# Patient Record
Sex: MALE
[De-identification: names, ages, dates, MRNs, and addresses within clinical notes are randomized per-mention and may not be internally consistent; named-entity substitution may affect disease eponyms.]

---

## 2022-04-22 ENCOUNTER — APPOINTMENT (OUTPATIENT)
Dept: ORTHOPEDIC SURGERY | Facility: CLINIC | Age: 87
End: 2022-04-22

## 2022-07-04 ENCOUNTER — EMERGENCY (EMERGENCY)
Facility: HOSPITAL | Age: 87
LOS: 1 days | Discharge: HOME | End: 2022-07-04
Attending: EMERGENCY MEDICINE
Payer: MEDICARE

## 2022-07-04 VITALS
TEMPERATURE: 98 F | DIASTOLIC BLOOD PRESSURE: 108 MMHG | SYSTOLIC BLOOD PRESSURE: 196 MMHG | OXYGEN SATURATION: 98 % | WEIGHT: 145.06 LBS | RESPIRATION RATE: 19 BRPM | HEIGHT: 66 IN | HEART RATE: 78 BPM

## 2022-07-04 PROCEDURE — 99284 EMERGENCY DEPT VISIT MOD MDM: CPT

## 2022-07-04 NOTE — ED PROVIDER NOTE - NS ED ROS FT
CONST: no fevers, no chills  EYES: no pain. +blurry vision  ENT: no sore throat, no ear pain, no change in hearing  CV: no chest pain, no leg swelling  RESP: no shortness of breath, no cough  ABD: no abdominal pain, no nausea, no vomiting, no diarrhea  : no dysuria, no flank pain, no hematuria  MSK: no back pain, no extremity pain  NEURO: no headache or additional neurologic complaints  HEME: no easy bleeding  SKIN:  no rash

## 2022-07-04 NOTE — ED PROVIDER NOTE - PHYSICAL EXAMINATION
GENERAL: Awake, alert, NAD  HEENT: NC/AT, moist mucous membranes, PERRL, EOMI. R eye pressure 15, L eye pressure 17. Visual acuity R eye 20/70, L 20/50  LUNGS: CTAB, no wheezes or crackles   CARDIAC: RRR, no m/r/g  ABDOMEN: Soft,  non tender, non distended, no rebound, no guarding  EXT: No edema, no calf tenderness  NEURO: A&Ox3. Moving all extremities.  SKIN: Warm and dry. No rash.

## 2022-07-04 NOTE — ED ADULT TRIAGE NOTE - CHIEF COMPLAINT QUOTE
R eye blurry vision since yesterday 0700 AM. Hx cataract surgery 1 week ago on R eye. BEFAST negative.

## 2022-07-04 NOTE — ED PROVIDER NOTE - CLINICAL SUMMARY MEDICAL DECISION MAKING FREE TEXT BOX
sal -87 m sp cataract surg last week with rt eye blurriness - pt reports vison was good - then woke up on sunday with blurriness - on exam 20/30 affected eye -- reactive - field intract no dc no erythema check pressures as pt is on pred forte - r/o mild glaucoma- ninaley wo pain optho to evaluate

## 2022-07-04 NOTE — ED PROVIDER NOTE - NSFOLLOWUPINSTRUCTIONS_ED_ALL_ED_FT
1. Please follow up with your Ophthalmologist for follow up after surgery.     2. You were seen by our service and found with no emergent findings at this time.     3. Continue your medications as prescribed.     4. Please return to ED if you have persistent or worsening symptoms or new onset eye pain and redness.

## 2022-07-04 NOTE — ED PROVIDER NOTE - PATIENT PORTAL LINK FT
You can access the FollowMyHealth Patient Portal offered by [facility] by registering at the following website: [email or web address]. By joining [facility]’s FollowMyHealth portal, you will also be able to view your health information using other applications (apps) compatible with our system.

## 2022-07-04 NOTE — ED PROVIDER NOTE - OBJECTIVE STATEMENT
87y male pt PMHx glaucoma w recent glaucoma sx 1 week ago over R eye coming in to ED endorsing blurry vision over said eye. Denies eye pain, loss of complete vision, or conjunctival erythema. On prednisone, ketorolac, and ofloxacin ophthalmic drops

## 2022-07-04 NOTE — CONSULT NOTE ADULT - ASSESSMENT
Assessment and Recommendations:  87y male with a past medical history/ocular history of cataract surgery x1 week, found to have healed corneal abrasion OD.       #Healed Corneal Abrasion OD  -Pt with blurry vision on Saturday, with improvement by time of interview. On our exam, pt was 20/50 PH 20/30- OD, and 20/25 OS. PERRL no APD. IOP 16 OU. CVF full, EOM full, color plates full. Anterior exam showed linear spk paracentrally OD consistent with healed corneal abrasion. Surgical wound was alicia negative. No corneal haze suggestive of edema OD. OS was within normal limits. Dilated fundus exam within normal limits OU.   -    Outpatient Follow-up: Patient should follow-up with his/her ophthalmologist or with [facility] Department of Ophthalmology within 1 week of after discharge at:    [address]  [place] [address]  [phone]    cT Chaudhry MD PGY 1  Available on [employer] Teams Assessment and Recommendations:  87y male with a past medical history/ocular history of cataract surgery x1 week, found to have healed corneal abrasion OD.       #Healed Corneal Abrasion OD  -Pt with blurry vision on Saturday, with improvement by time of interview. On our exam, pt was 20/50 PH 20/30- OD, and 20/25 OS. PERRL no APD. IOP 16 OU. CVF full, EOM full, color plates full. Anterior exam showed linear spk paracentrally OD consistent with healed corneal abrasion. Surgical wound was alicia negative. No corneal haze suggestive of edema OD. OS was within normal limits. Dilated fundus exam within normal limits OU.   -Exam consistent with corneal abrasion. Pt already on Ofloxacin, Ketorolac, and Pred Forte post-operatively. Recommend patient continue with this medication course until his follow-up appointment on Thursday at [facility].     DW Dr. Hood Sales MD PGY-4    Outpatient Follow-up: Patient should follow-up with his/her ophthalmologist or with [facility] Department of Ophthalmology within 1 week of after discharge at:    [address]  [place] [address]  [phone]    Tc Chaudhry MD PGY 1  Available on Microsoft Teams

## 2022-07-04 NOTE — CONSULT NOTE ADULT - SUBJECTIVE AND OBJECTIVE BOX
[facility] DEPARTMENT OF OPHTHALMOLOGY - INITIAL ADULT CONSULT  -----------------------------------------------------------------------------------------------------------------  Tc Chaudhry MD PGY 1  Contact via [employer] Teams  -----------------------------------------------------------------------------------------------------------------    HPI: 87M hx of cataract surgery OD 1 week ago presenting with sudden onset blurry vision 2 days duration. At the time of this exam patient states his blurry vision has essentially resolved. Patient denies ever having eye redness, tearing, photosensivity or pain. States that "numbing" drop given by ED provider made him feel better. States he was rubbing his eye on Friday.     PAST MEDICAL & SURGICAL HISTORY:    Past Ocular History: CEIOL OD.     Family History:  FAMILY HISTORY:          Ophthalmic Medications: Ofloxacin, Ketorolac, Pred Forte OD.    MEDICATIONS  (STANDING):    MEDICATIONS  (PRN):    Allergies & Intolerances:     Review of Systems:  Constitutional: No fever, chills  Eyes: No blurry vision, flashes, floaters, FBS, erythema, discharge, double vision, OU  Neuro: No tremors  Cardiovascular: No chest pain, palpitations  Respiratory: No SOB, no cough  GI: No nausea, vomiting, abdominal pain  : No dysuria  Skin: no rash  Psych: no depression  Endocrine: no polyuria, polydipsia  Heme/lymph: no swelling    VITALS: T(C): 36.7 (07-04-22 @ 09:21)  T(F): 98.1 (07-04-22 @ 09:21), Max: 98.1 (07-04-22 @ 09:21)  HR: 78 (07-04-22 @ 09:21) (78 - 78)  BP: 196/108 (07-04-22 @ 09:21) (196/108 - 196/108)  RR:  (19 - 19)  SpO2:  (98% - 98%)  Wt(kg): --  General: AAO x 3, appropriate mood and affect    Ophthalmology Exam:  Visual acuity (sc): 20/50 PH 20/30- OD, 20/25 OS.  Pupils: PERRL OU, no APD  Tonometry:  16 OU  Extraocular movements (EOMs): Full OU, no pain, no diplopia.  Confrontational Visual Field (CVF): Full OU.  Color Plates: 12/12 OU.    Pen Light Exam (PLE)  External: Normal OU.  Lids/Lashes/Lacrimal Ducts: Flat OU.  Sclera/Conjunctiva: White and quiet OU.  Cornea: Paracentral linear SPK, likely representing healed corneal abrasion OD. No bethel epi defect noted OD. Beverly negative. Clear OS.   Anterior Chamber: Deep and formed OU.    Iris: Flat OU.  Lens: PCIOL OD. NS OS.     Fundus Exam: dilated with 1% tropicamide and 2.5% phenylephrine  Approval obtained from primary team for dilation  Patient aware that pupils can remained dilated for at least 4-6 hours.  Exam performed with 20 D lens    Vitreous: wnl OU  Disc, cup/disc: sharp and pink, 0.4 OU  Macula: wnl OU  Vessels: wnl OU  Periphery: wnl OU

## 2022-07-04 NOTE — ED ADULT NURSE NOTE - OBJECTIVE STATEMENT
87 male sp cataract surg last week with rt eye blurriness - pt reports vison was good - then woke up on sunday with blurriness - on exam 20/30 affected eye -- reactive - field intract no dc no erythema check pressures as pt is on pred forte - r/o mild glaucoma- unlikley wo pain optho to evaluate  88 yo male complaining of blurriness on R eye after surgery a week ago for catacact. Pt alerted and oriented x3, family at the bedside, no sings of acute distress noted, pending on MD examination.

## 2022-11-22 PROBLEM — Z00.00 ENCOUNTER FOR PREVENTIVE HEALTH EXAMINATION: Status: ACTIVE | Noted: 2022-11-22

## 2024-09-23 PROBLEM — H40.9 UNSPECIFIED GLAUCOMA: Chronic | Status: ACTIVE | Noted: 2022-07-04

## 2024-09-27 ENCOUNTER — APPOINTMENT (OUTPATIENT)
Dept: ORTHOPEDIC SURGERY | Facility: CLINIC | Age: 89
End: 2024-09-27
Payer: MEDICARE

## 2024-09-27 VITALS — HEIGHT: 66 IN | WEIGHT: 154 LBS | BODY MASS INDEX: 24.75 KG/M2

## 2024-09-27 DIAGNOSIS — M19.071 PRIMARY OSTEOARTHRITIS, RIGHT ANKLE AND FOOT: ICD-10-CM

## 2024-09-27 PROCEDURE — 73610 X-RAY EXAM OF ANKLE: CPT | Mod: RT

## 2024-09-27 PROCEDURE — 99204 OFFICE O/P NEW MOD 45 MIN: CPT

## 2024-09-27 PROCEDURE — 99203 OFFICE O/P NEW LOW 30 MIN: CPT

## 2024-09-27 PROCEDURE — L1902: CPT | Mod: RT

## 2024-09-27 RX ORDER — MELOXICAM 15 MG/1
TABLET ORAL
Refills: 0 | Status: ACTIVE | COMMUNITY

## 2024-09-27 NOTE — PHYSICAL EXAM
[Mild] : mild diffused ankle swelling [NL (40)] : plantar flexion 40 degrees [NL 30)] : inversion 30 degrees [NL (20)] : eversion 20 degrees [5___] : inversion 5[unfilled]/5 [4___] : eversion 4[unfilled]/5 [2+] : posterior tibialis pulse: 2+ [Normal] : saphenous nerve sensation normal [] : ambulation with cane [Right] : right ankle [Weight -] : weightbearing [FreeTextEntry9] : Tibiotalar joint degenerative changes which has worsened compared to 2022 films. There is 14 degrees of tibiotalar valgus tilt.  [TWNoteComboBox7] : dorsiflexion 10 degrees

## 2024-09-27 NOTE — HISTORY OF PRESENT ILLNESS
[9] : 9 [Sharp] : sharp [Nothing helps with pain getting better] : Nothing helps with pain getting better [Walking] : walking [de-identified] : DONAVAN NOLAND a 89 year  old male here for evaluation of his right ankle . Patient states he fell down 2 years ago. Has a known history of ankle arthritis. He presents today WB in sneakers, using a cane for support. Takes Mobic 15 mg qday without significant relief. No new trauma or injury but has been having some increased pain recently.  [] : no [de-identified] : [facility]

## 2024-09-27 NOTE — DISCUSSION/SUMMARY
[de-identified] : Discussed treatment options with patient. WBAT in supportive footwear, lace up brace.  NSAIDS (topical vs. oral), ice, activity modification discussed.  The role of a steroid injection discussed with patient, he declines at this time.

## 2024-11-08 ENCOUNTER — APPOINTMENT (OUTPATIENT)
Dept: ORTHOPEDIC SURGERY | Facility: CLINIC | Age: 89
End: 2024-11-08
Payer: MEDICARE

## 2024-11-08 DIAGNOSIS — M19.071 PRIMARY OSTEOARTHRITIS, RIGHT ANKLE AND FOOT: ICD-10-CM

## 2024-11-08 PROCEDURE — 99213 OFFICE O/P EST LOW 20 MIN: CPT

## 2024-12-04 ENCOUNTER — APPOINTMENT (OUTPATIENT)
Dept: ORTHOPEDIC SURGERY | Facility: CLINIC | Age: 89
End: 2024-12-04
Payer: MEDICARE

## 2024-12-04 DIAGNOSIS — M19.071 PRIMARY OSTEOARTHRITIS, RIGHT ANKLE AND FOOT: ICD-10-CM

## 2024-12-04 DIAGNOSIS — Z78.9 OTHER SPECIFIED HEALTH STATUS: ICD-10-CM

## 2024-12-04 PROCEDURE — 20606 DRAIN/INJ JOINT/BURSA W/US: CPT | Mod: RT

## 2024-12-04 PROCEDURE — J3490M: CUSTOM | Mod: JZ

## 2024-12-04 PROCEDURE — 99214 OFFICE O/P EST MOD 30 MIN: CPT | Mod: 25

## 2025-01-13 ENCOUNTER — APPOINTMENT (OUTPATIENT)
Dept: ORTHOPEDIC SURGERY | Facility: CLINIC | Age: 89
End: 2025-01-13

## 2025-04-23 ENCOUNTER — APPOINTMENT (OUTPATIENT)
Dept: ORTHOPEDIC SURGERY | Facility: CLINIC | Age: 89
End: 2025-04-23

## 2025-04-23 VITALS — HEIGHT: 66 IN | BODY MASS INDEX: 24.11 KG/M2 | WEIGHT: 150 LBS

## 2025-04-23 DIAGNOSIS — M16.11 UNILATERAL PRIMARY OSTEOARTHRITIS, RIGHT HIP: ICD-10-CM

## 2025-04-23 PROCEDURE — 99213 OFFICE O/P EST LOW 20 MIN: CPT | Mod: 25

## 2025-04-23 PROCEDURE — 73503 X-RAY EXAM HIP UNI 4/> VIEWS: CPT | Mod: RT

## 2025-05-15 ENCOUNTER — APPOINTMENT (OUTPATIENT)
Dept: PAIN MANAGEMENT | Facility: CLINIC | Age: 89
End: 2025-05-15

## 2025-05-15 DIAGNOSIS — M16.11 UNILATERAL PRIMARY OSTEOARTHRITIS, RIGHT HIP: ICD-10-CM

## 2025-05-15 PROCEDURE — J3490M: CUSTOM

## 2025-05-15 PROCEDURE — 27093 INJECTION FOR HIP X-RAY: CPT | Mod: RT

## 2025-05-15 PROCEDURE — 73525 CONTRAST X-RAY OF HIP: CPT | Mod: 26,59

## 2025-06-13 ENCOUNTER — APPOINTMENT (OUTPATIENT)
Dept: ORTHOPEDIC SURGERY | Facility: CLINIC | Age: 89
End: 2025-06-13
Payer: MEDICARE

## 2025-06-13 PROCEDURE — 99215 OFFICE O/P EST HI 40 MIN: CPT

## 2025-06-18 ENCOUNTER — NON-APPOINTMENT (OUTPATIENT)
Age: 89
End: 2025-06-18

## 2025-06-20 ENCOUNTER — APPOINTMENT (OUTPATIENT)
Dept: ORTHOPEDIC SURGERY | Facility: CLINIC | Age: 89
End: 2025-06-20

## 2025-06-20 ENCOUNTER — OUTPATIENT (OUTPATIENT)
Dept: OUTPATIENT SERVICES | Facility: HOSPITAL | Age: 89
LOS: 1 days | End: 2025-06-20
Payer: MEDICARE

## 2025-06-20 DIAGNOSIS — Z01.818 ENCOUNTER FOR OTHER PREPROCEDURAL EXAMINATION: ICD-10-CM

## 2025-06-20 DIAGNOSIS — Z98.890 OTHER SPECIFIED POSTPROCEDURAL STATES: Chronic | ICD-10-CM

## 2025-06-20 DIAGNOSIS — M16.11 UNILATERAL PRIMARY OSTEOARTHRITIS, RIGHT HIP: ICD-10-CM

## 2025-06-20 DIAGNOSIS — Z98.49 CATARACT EXTRACTION STATUS, UNSPECIFIED EYE: Chronic | ICD-10-CM

## 2025-06-20 LAB
A1C WITH ESTIMATED AVERAGE GLUCOSE RESULT: 4.9 % — SIGNIFICANT CHANGE UP (ref 4–5.6)
ALBUMIN SERPL ELPH-MCNC: 3.6 G/DL — SIGNIFICANT CHANGE UP (ref 3.3–5)
ALP SERPL-CCNC: 107 U/L — SIGNIFICANT CHANGE UP (ref 40–120)
ALT FLD-CCNC: 22 U/L — SIGNIFICANT CHANGE UP (ref 12–78)
ANION GAP SERPL CALC-SCNC: 5 MMOL/L — SIGNIFICANT CHANGE UP (ref 5–17)
APTT BLD: 32 SEC — SIGNIFICANT CHANGE UP (ref 26.1–36.8)
AST SERPL-CCNC: 25 U/L — SIGNIFICANT CHANGE UP (ref 15–37)
BASOPHILS # BLD AUTO: 0.04 K/UL — SIGNIFICANT CHANGE UP (ref 0–0.2)
BASOPHILS NFR BLD AUTO: 0.6 % — SIGNIFICANT CHANGE UP (ref 0–2)
BILIRUB SERPL-MCNC: 0.6 MG/DL — SIGNIFICANT CHANGE UP (ref 0.2–1.2)
BUN SERPL-MCNC: 15 MG/DL — SIGNIFICANT CHANGE UP (ref 7–23)
CALCIUM SERPL-MCNC: 9.8 MG/DL — SIGNIFICANT CHANGE UP (ref 8.5–10.1)
CHLORIDE SERPL-SCNC: 109 MMOL/L — HIGH (ref 96–108)
CO2 SERPL-SCNC: 27 MMOL/L — SIGNIFICANT CHANGE UP (ref 22–31)
CREAT SERPL-MCNC: 0.85 MG/DL — SIGNIFICANT CHANGE UP (ref 0.5–1.3)
EGFR: 84 ML/MIN/1.73M2 — SIGNIFICANT CHANGE UP
EGFR: 84 ML/MIN/1.73M2 — SIGNIFICANT CHANGE UP
EOSINOPHIL # BLD AUTO: 0.16 K/UL — SIGNIFICANT CHANGE UP (ref 0–0.5)
EOSINOPHIL NFR BLD AUTO: 2.5 % — SIGNIFICANT CHANGE UP (ref 0–6)
ESTIMATED AVERAGE GLUCOSE: 94 MG/DL — SIGNIFICANT CHANGE UP (ref 68–114)
GLUCOSE SERPL-MCNC: 101 MG/DL — HIGH (ref 70–99)
HCT VFR BLD CALC: 38.9 % — LOW (ref 39–50)
HGB BLD-MCNC: 13.3 G/DL — SIGNIFICANT CHANGE UP (ref 13–17)
IMM GRANULOCYTES NFR BLD AUTO: 0.5 % — SIGNIFICANT CHANGE UP (ref 0–0.9)
INR BLD: 0.96 RATIO — SIGNIFICANT CHANGE UP (ref 0.85–1.16)
LYMPHOCYTES # BLD AUTO: 1.15 K/UL — SIGNIFICANT CHANGE UP (ref 1–3.3)
LYMPHOCYTES # BLD AUTO: 18.1 % — SIGNIFICANT CHANGE UP (ref 13–44)
MCHC RBC-ENTMCNC: 31.1 PG — SIGNIFICANT CHANGE UP (ref 27–34)
MCHC RBC-ENTMCNC: 34.2 G/DL — SIGNIFICANT CHANGE UP (ref 32–36)
MCV RBC AUTO: 91.1 FL — SIGNIFICANT CHANGE UP (ref 80–100)
MONOCYTES # BLD AUTO: 0.41 K/UL — SIGNIFICANT CHANGE UP (ref 0–0.9)
MONOCYTES NFR BLD AUTO: 6.4 % — SIGNIFICANT CHANGE UP (ref 2–14)
NEUTROPHILS # BLD AUTO: 4.57 K/UL — SIGNIFICANT CHANGE UP (ref 1.8–7.4)
NEUTROPHILS NFR BLD AUTO: 71.9 % — SIGNIFICANT CHANGE UP (ref 43–77)
NRBC BLD AUTO-RTO: 0 /100 WBCS — SIGNIFICANT CHANGE UP (ref 0–0)
PLATELET # BLD AUTO: 249 K/UL — SIGNIFICANT CHANGE UP (ref 150–400)
POTASSIUM SERPL-MCNC: 4 MMOL/L — SIGNIFICANT CHANGE UP (ref 3.5–5.3)
POTASSIUM SERPL-SCNC: 4 MMOL/L — SIGNIFICANT CHANGE UP (ref 3.5–5.3)
PROT SERPL-MCNC: 7 GM/DL — SIGNIFICANT CHANGE UP (ref 6–8.3)
PROTHROM AB SERPL-ACNC: 10.8 SEC — SIGNIFICANT CHANGE UP (ref 9.9–13.4)
RBC # BLD: 4.27 M/UL — SIGNIFICANT CHANGE UP (ref 4.2–5.8)
RBC # FLD: 13.6 % — SIGNIFICANT CHANGE UP (ref 10.3–14.5)
SODIUM SERPL-SCNC: 141 MMOL/L — SIGNIFICANT CHANGE UP (ref 135–145)
WBC # BLD: 6.36 K/UL — SIGNIFICANT CHANGE UP (ref 3.8–10.5)
WBC # FLD AUTO: 6.36 K/UL — SIGNIFICANT CHANGE UP (ref 3.8–10.5)

## 2025-06-20 PROCEDURE — 93010 ELECTROCARDIOGRAM REPORT: CPT

## 2025-06-20 NOTE — H&P PST ADULT - ASSESSMENT
right hip osteoarthritis   CAPRINI SCORE    AGE RELATED RISK FACTORS                                                             [ ] Age 41-60 years                                            (1 Point)  [ ] Age: 61-74 years                                           (2 Points)                 [x ] Age= 75 years                                                (3 Points)             DISEASE RELATED RISK FACTORS                                                       [ ] Edema in the lower extremities                 (1 Point)                     [ ] Varicose veins                                               (1 Point)                                 [x ] BMI > 25 Kg/m2                                            (1 Point)                                  [ ] Serious infection (ie PNA)                            (1 Point)                     [ ] Lung disease ( COPD, Emphysema)            (1 Point)                                                                          [ ] Acute myocardial infarction                         (1 Point)                  [ ] Congestive heart failure (in the previous month)  (1 Point)         [ ] Inflammatory bowel disease                            (1 Point)                  [ ] Central venous access, PICC or Port               (2 points)       (within the last month)                                                                [ ] Stroke (in the previous month)                        (5 Points)    [ ] Previous or present malignancy                       (2 points)                                                                                                                                                         HEMATOLOGY RELATED FACTORS                                                         [ ] Prior episodes of VTE                                     (3 Points)                     [ ] Positive family history for VTE                      (3 Points)                  [ ] Prothrombin 90177 A                                     (3 Points)                     [ ] Factor V Leiden                                                (3 Points)                        [ ] Lupus anticoagulants                                      (3 Points)                                                           [ ] Anticardiolipin antibodies                              (3 Points)                                                       [ ] High homocysteine in the blood                   (3 Points)                                             [ ] Other congenital or acquired thrombophilia      (3 Points)                                                [ ] Heparin induced thrombocytopenia                  (3 Points)                                        MOBILITY RELATED FACTORS  [ ] Bed rest                                                         (1 Point)  [ ] Plaster cast                                                    (2 points)  [ ] Bed bound for more than 72 hours           (2 Points)    GENDER SPECIFIC FACTORS  [ ] Pregnancy or had a baby within the last month   (1 Point)  [ ] Post-partum < 6 weeks                                   (1 Point)  [ ] Hormonal therapy  or oral contraception   (1 Point)  [ ] History of pregnancy complications              (1 point)  [ ] Unexplained or recurrent              (1 Point)    OTHER RISK FACTORS                                           (1 Point)  [ ] BMI >40, smoking, diabetes requiring insulin, chemotherapy  blood transfusions and length of surgery over 2 hours    SURGERY RELATED RISK FACTORS  [ ]  Section within the last month     (1 Point)  [ ] Minor surgery                                                  (1 Point)  [ ] Arthroscopic surgery                                       (2 Points)  [ ] Planned major surgery lasting more            (2 Points)      than 45 minutes     x[ ] Elective hip or knee joint replacement       (5 points)       surgery                                                TRAUMA RELATED RISK FACTORS  [ ] Fracture of the hip, pelvis, or leg                       (5 Points)  [ ] Spinal cord injury resulting in paralysis             (5 points)       (in the previous month)    [ ] Paralysis  (less than 1 month)                             (5 Points)  [ ] Multiple Trauma within 1 month                        (5 Points)    Total Score [   9     ]    Caprini Score 0-2: Low Risk, NO VTE prophylaxis required for most patients, encourage ambulation  Caprini Score 3-6: Moderate Risk , pharmacologic VTE prophylaxis is indicated for most patients (in the absence of contraindications)  Caprini Score Greater than or =7: High risk, pharmocologic VTE prophylaxis indicated for most patients (in the absence of contraindications)

## 2025-06-20 NOTE — H&P PST ADULT - HISTORY OF PRESENT ILLNESS
89 yo male c/oo right hip pain 2/2 osteoarthritis - scheduled for right  hip arthroplasty  goal: to walk without pain

## 2025-06-20 NOTE — H&P PST ADULT - NSANTHOSAYNRD_GEN_A_CORE
No. JOSE RAFAEL screening performed.  STOP BANG Legend: 0-2 = LOW Risk; 3-4 = INTERMEDIATE Risk; 5-8 = HIGH Risk

## 2025-06-21 LAB
MRSA PCR RESULT.: SIGNIFICANT CHANGE UP
S AUREUS DNA NOSE QL NAA+PROBE: SIGNIFICANT CHANGE UP
VIT D25+D1,25 OH+D1,25 PNL SERPL-MCNC: 68.2 PG/ML — SIGNIFICANT CHANGE UP (ref 19.9–79.3)

## 2025-06-23 ENCOUNTER — APPOINTMENT (OUTPATIENT)
Dept: ORTHOPEDIC SURGERY | Facility: CLINIC | Age: 89
End: 2025-06-23
Payer: MEDICARE

## 2025-06-23 ENCOUNTER — NON-APPOINTMENT (OUTPATIENT)
Age: 89
End: 2025-06-23

## 2025-06-23 PROCEDURE — 99213 OFFICE O/P EST LOW 20 MIN: CPT

## 2025-06-24 ENCOUNTER — NON-APPOINTMENT (OUTPATIENT)
Age: 89
End: 2025-06-24

## 2025-06-24 ENCOUNTER — APPOINTMENT (OUTPATIENT)
Dept: CARDIOLOGY | Facility: CLINIC | Age: 89
End: 2025-06-24
Payer: MEDICARE

## 2025-06-24 VITALS
TEMPERATURE: 98.7 F | RESPIRATION RATE: 18 BRPM | OXYGEN SATURATION: 97 % | DIASTOLIC BLOOD PRESSURE: 70 MMHG | WEIGHT: 141 LBS | BODY MASS INDEX: 22.66 KG/M2 | HEIGHT: 66 IN | HEART RATE: 82 BPM | SYSTOLIC BLOOD PRESSURE: 125 MMHG

## 2025-06-24 PROBLEM — R60.0 LOWER EXTREMITY EDEMA: Status: ACTIVE | Noted: 2025-06-24

## 2025-06-24 PROBLEM — R01.1 SYSTOLIC MURMUR: Status: ACTIVE | Noted: 2025-06-24

## 2025-06-24 PROBLEM — Z78.9 DOES NOT USE TOBACCO: Status: ACTIVE | Noted: 2025-06-24

## 2025-06-24 PROBLEM — Z01.810 PREOPERATIVE CARDIOVASCULAR EXAMINATION: Status: ACTIVE | Noted: 2025-06-24

## 2025-06-24 PROBLEM — I45.10 RBBB (RIGHT BUNDLE BRANCH BLOCK): Status: ACTIVE | Noted: 2025-06-24

## 2025-06-24 PROCEDURE — 99204 OFFICE O/P NEW MOD 45 MIN: CPT | Mod: 25

## 2025-06-24 PROCEDURE — 93000 ELECTROCARDIOGRAM COMPLETE: CPT

## 2025-06-30 ENCOUNTER — APPOINTMENT (OUTPATIENT)
Dept: CARDIOLOGY | Facility: CLINIC | Age: 89
End: 2025-06-30
Payer: MEDICARE

## 2025-06-30 PROCEDURE — 93306 TTE W/DOPPLER COMPLETE: CPT

## 2025-07-06 RX ORDER — HYDROMORPHONE/SOD CHLOR,ISO/PF 2 MG/10 ML
0.5 SYRINGE (ML) INJECTION
Refills: 0 | Status: DISCONTINUED | OUTPATIENT
Start: 2025-07-07 | End: 2025-07-09

## 2025-07-06 RX ORDER — CELECOXIB 50 MG/1
200 CAPSULE ORAL EVERY 12 HOURS
Refills: 0 | Status: DISCONTINUED | OUTPATIENT
Start: 2025-07-08 | End: 2025-07-09

## 2025-07-06 RX ORDER — B1/B2/B3/B5/B6/B12/VIT C/FOLIC 500-0.5 MG
1 TABLET ORAL DAILY
Refills: 0 | Status: DISCONTINUED | OUTPATIENT
Start: 2025-07-07 | End: 2025-07-09

## 2025-07-06 RX ORDER — OXYCODONE HYDROCHLORIDE 30 MG/1
10 TABLET ORAL EVERY 4 HOURS
Refills: 0 | Status: DISCONTINUED | OUTPATIENT
Start: 2025-07-07 | End: 2025-07-09

## 2025-07-06 RX ORDER — SODIUM CHLORIDE 9 G/1000ML
1000 INJECTION, SOLUTION INTRAVENOUS
Refills: 0 | Status: DISCONTINUED | OUTPATIENT
Start: 2025-07-07 | End: 2025-07-09

## 2025-07-06 RX ORDER — ONDANSETRON HCL/PF 4 MG/2 ML
4 VIAL (ML) INJECTION EVERY 6 HOURS
Refills: 0 | Status: DISCONTINUED | OUTPATIENT
Start: 2025-07-07 | End: 2025-07-09

## 2025-07-06 RX ORDER — TRAMADOL HYDROCHLORIDE 50 MG/1
50 TABLET, FILM COATED ORAL EVERY 6 HOURS
Refills: 0 | Status: DISCONTINUED | OUTPATIENT
Start: 2025-07-07 | End: 2025-07-09

## 2025-07-06 RX ORDER — ACETAMINOPHEN 500 MG/5ML
1000 LIQUID (ML) ORAL ONCE
Refills: 0 | Status: DISCONTINUED | OUTPATIENT
Start: 2025-07-07 | End: 2025-07-09

## 2025-07-06 RX ORDER — POLYETHYLENE GLYCOL 3350 17 G/17G
17 POWDER, FOR SOLUTION ORAL AT BEDTIME
Refills: 0 | Status: DISCONTINUED | OUTPATIENT
Start: 2025-07-07 | End: 2025-07-09

## 2025-07-06 RX ORDER — SENNA 187 MG
2 TABLET ORAL AT BEDTIME
Refills: 0 | Status: DISCONTINUED | OUTPATIENT
Start: 2025-07-07 | End: 2025-07-09

## 2025-07-06 RX ORDER — OXYCODONE HYDROCHLORIDE 30 MG/1
5 TABLET ORAL EVERY 4 HOURS
Refills: 0 | Status: DISCONTINUED | OUTPATIENT
Start: 2025-07-07 | End: 2025-07-09

## 2025-07-06 RX ORDER — ASPIRIN 325 MG
81 TABLET ORAL
Refills: 0 | Status: DISCONTINUED | OUTPATIENT
Start: 2025-07-08 | End: 2025-07-09

## 2025-07-06 RX ORDER — ACETAMINOPHEN 500 MG/5ML
650 LIQUID (ML) ORAL EVERY 6 HOURS
Refills: 0 | Status: DISCONTINUED | OUTPATIENT
Start: 2025-07-07 | End: 2025-07-09

## 2025-07-07 ENCOUNTER — INPATIENT (INPATIENT)
Facility: HOSPITAL | Age: 89
LOS: 1 days | Discharge: NURSING, REHAB OR LONG-TERM CARE | End: 2025-07-09
Attending: STUDENT IN AN ORGANIZED HEALTH CARE EDUCATION/TRAINING PROGRAM | Admitting: STUDENT IN AN ORGANIZED HEALTH CARE EDUCATION/TRAINING PROGRAM
Payer: MEDICARE

## 2025-07-07 ENCOUNTER — APPOINTMENT (OUTPATIENT)
Dept: ORTHOPEDIC SURGERY | Facility: HOSPITAL | Age: 89
End: 2025-07-07
Payer: MEDICARE

## 2025-07-07 ENCOUNTER — TRANSCRIPTION ENCOUNTER (OUTPATIENT)
Age: 89
End: 2025-07-07

## 2025-07-07 ENCOUNTER — RESULT REVIEW (OUTPATIENT)
Age: 89
End: 2025-07-07

## 2025-07-07 VITALS
TEMPERATURE: 98 F | RESPIRATION RATE: 18 BRPM | DIASTOLIC BLOOD PRESSURE: 79 MMHG | WEIGHT: 141.98 LBS | SYSTOLIC BLOOD PRESSURE: 120 MMHG | OXYGEN SATURATION: 98 % | HEIGHT: 64 IN | HEART RATE: 72 BPM

## 2025-07-07 DIAGNOSIS — Z98.49 CATARACT EXTRACTION STATUS, UNSPECIFIED EYE: Chronic | ICD-10-CM

## 2025-07-07 DIAGNOSIS — Z98.890 OTHER SPECIFIED POSTPROCEDURAL STATES: Chronic | ICD-10-CM

## 2025-07-07 LAB
ANION GAP SERPL CALC-SCNC: 9 MMOL/L — SIGNIFICANT CHANGE UP (ref 5–17)
BUN SERPL-MCNC: 17 MG/DL — SIGNIFICANT CHANGE UP (ref 7–23)
CALCIUM SERPL-MCNC: 8.6 MG/DL — SIGNIFICANT CHANGE UP (ref 8.5–10.1)
CHLORIDE SERPL-SCNC: 112 MMOL/L — HIGH (ref 96–108)
CO2 SERPL-SCNC: 16 MMOL/L — LOW (ref 22–31)
CREAT SERPL-MCNC: 0.65 MG/DL — SIGNIFICANT CHANGE UP (ref 0.5–1.3)
EGFR: 91 ML/MIN/1.73M2 — SIGNIFICANT CHANGE UP
EGFR: 91 ML/MIN/1.73M2 — SIGNIFICANT CHANGE UP
GLUCOSE BLDC GLUCOMTR-MCNC: 180 MG/DL — HIGH (ref 70–99)
GLUCOSE SERPL-MCNC: 104 MG/DL — HIGH (ref 70–99)
HCT VFR BLD CALC: 33.3 % — LOW (ref 39–50)
HGB BLD-MCNC: 10.9 G/DL — LOW (ref 13–17)
MCHC RBC-ENTMCNC: 31.2 PG — SIGNIFICANT CHANGE UP (ref 27–34)
MCHC RBC-ENTMCNC: 32.7 G/DL — SIGNIFICANT CHANGE UP (ref 32–36)
MCV RBC AUTO: 95.4 FL — SIGNIFICANT CHANGE UP (ref 80–100)
NRBC BLD AUTO-RTO: 0 /100 WBCS — SIGNIFICANT CHANGE UP (ref 0–0)
PLATELET # BLD AUTO: 199 K/UL — SIGNIFICANT CHANGE UP (ref 150–400)
POTASSIUM SERPL-MCNC: 3.6 MMOL/L — SIGNIFICANT CHANGE UP (ref 3.5–5.3)
POTASSIUM SERPL-SCNC: 3.6 MMOL/L — SIGNIFICANT CHANGE UP (ref 3.5–5.3)
RBC # BLD: 3.49 M/UL — LOW (ref 4.2–5.8)
RBC # FLD: 13.5 % — SIGNIFICANT CHANGE UP (ref 10.3–14.5)
SODIUM SERPL-SCNC: 137 MMOL/L — SIGNIFICANT CHANGE UP (ref 135–145)
WBC # BLD: 10.2 K/UL — SIGNIFICANT CHANGE UP (ref 3.8–10.5)
WBC # FLD AUTO: 10.2 K/UL — SIGNIFICANT CHANGE UP (ref 3.8–10.5)

## 2025-07-07 PROCEDURE — 73501 X-RAY EXAM HIP UNI 1 VIEW: CPT | Mod: 26,RT

## 2025-07-07 PROCEDURE — 27130 TOTAL HIP ARTHROPLASTY: CPT | Mod: RT

## 2025-07-07 DEVICE — LINER ACETABULAR ALTRX: Type: IMPLANTABLE DEVICE | Site: RIGHT | Status: FUNCTIONAL

## 2025-07-07 DEVICE — STEM FEM ACTIS HIGH COLLAR SZ 5: Type: IMPLANTABLE DEVICE | Site: RIGHT | Status: FUNCTIONAL

## 2025-07-07 DEVICE — SHELL ACET PINNACLE W/ GRIPTION 52MM: Type: IMPLANTABLE DEVICE | Site: RIGHT | Status: FUNCTIONAL

## 2025-07-07 DEVICE — HEAD FEM CEM TAPR PLUS 1.5MM 12/14X36MM: Type: IMPLANTABLE DEVICE | Site: RIGHT | Status: FUNCTIONAL

## 2025-07-07 RX ORDER — HYDROMORPHONE/SOD CHLOR,ISO/PF 2 MG/10 ML
0.2 SYRINGE (ML) INJECTION
Refills: 0 | Status: DISCONTINUED | OUTPATIENT
Start: 2025-07-07 | End: 2025-07-07

## 2025-07-07 RX ORDER — SODIUM CHLORIDE 9 G/1000ML
1000 INJECTION, SOLUTION INTRAVENOUS
Refills: 0 | Status: DISCONTINUED | OUTPATIENT
Start: 2025-07-07 | End: 2025-07-07

## 2025-07-07 RX ORDER — ONDANSETRON HCL/PF 4 MG/2 ML
4 VIAL (ML) INJECTION ONCE
Refills: 0 | Status: DISCONTINUED | OUTPATIENT
Start: 2025-07-07 | End: 2025-07-07

## 2025-07-07 RX ORDER — CEFAZOLIN SODIUM IN 0.9 % NACL 3 G/100 ML
2000 INTRAVENOUS SOLUTION, PIGGYBACK (ML) INTRAVENOUS EVERY 8 HOURS
Refills: 0 | Status: COMPLETED | OUTPATIENT
Start: 2025-07-07 | End: 2025-07-07

## 2025-07-07 RX ORDER — DEXAMETHASONE 0.5 MG/1
10 TABLET ORAL ONCE
Refills: 0 | Status: COMPLETED | OUTPATIENT
Start: 2025-07-08 | End: 2025-07-08

## 2025-07-07 RX ORDER — CELECOXIB 50 MG/1
200 CAPSULE ORAL ONCE
Refills: 0 | Status: COMPLETED | OUTPATIENT
Start: 2025-07-07 | End: 2025-07-07

## 2025-07-07 RX ORDER — ACETAMINOPHEN 500 MG/5ML
650 LIQUID (ML) ORAL ONCE
Refills: 0 | Status: COMPLETED | OUTPATIENT
Start: 2025-07-07 | End: 2025-07-07

## 2025-07-07 RX ORDER — FOLIC ACID 1 MG/1
1 TABLET ORAL
Refills: 0 | DISCHARGE

## 2025-07-07 RX ADMIN — Medication 2 TABLET(S): at 21:15

## 2025-07-07 RX ADMIN — Medication 650 MILLIGRAM(S): at 23:40

## 2025-07-07 RX ADMIN — Medication 500 MILLIGRAM(S): at 18:20

## 2025-07-07 RX ADMIN — Medication 650 MILLIGRAM(S): at 19:20

## 2025-07-07 RX ADMIN — Medication 650 MILLIGRAM(S): at 07:17

## 2025-07-07 RX ADMIN — SODIUM CHLORIDE 115 MILLILITER(S): 9 INJECTION, SOLUTION INTRAVENOUS at 13:18

## 2025-07-07 RX ADMIN — Medication 100 MILLIGRAM(S): at 15:45

## 2025-07-07 RX ADMIN — Medication 650 MILLIGRAM(S): at 18:19

## 2025-07-07 RX ADMIN — Medication 500 MILLILITER(S): at 19:03

## 2025-07-07 RX ADMIN — CELECOXIB 200 MILLIGRAM(S): 50 CAPSULE ORAL at 07:18

## 2025-07-07 RX ADMIN — Medication 100 MILLIGRAM(S): at 23:41

## 2025-07-07 NOTE — DISCHARGE NOTE PROVIDER - NSDCFUADDAPPT_GEN_ALL_CORE_FT

## 2025-07-07 NOTE — DISCHARGE NOTE PROVIDER - NSDCFUADDINST_GEN_ALL_CORE_FT
Keep knee straight while at rest. Elevate the leg as much as possible ("toes above the nose") to help control swelling. Make sure you get up and take a brief walk every two hours to help with circulation and prevent stiffness. Incentive spirometer 10X/hour. Cryocuff to help with pain/inflammation (20 mins on, 20 mins off). Keep towel under ankle when resting to keep leg straight.     Keep TAM Dressing Clean, Dry and Intact. May shower with TAM Dressing. Please do not scrub, soak, peel or pick at the TAM dressing. No creams, lotions, powders, or oils over dressing. May shower and let water run over dressing, no baths. Pat dry once out of shower. Dressing to be removed in 7 days. If dressing is saturated from border to border - may remove and replace with clean dry dressing.    Shower instructions for TAM Dressing: Turn battery pack off. Twist OFF battery pack before entering shower. Once done with showering. Pat dressing dry. Reconnect and twist ON battery pack after you are dry. Then turn battery pack on.     There are no staples or stitches that need to be removed.  If you notice any redness, irritation, or itching around the dressing call the surgeon's office.     Elevate the leg as much as possible ("toes above the nose") to help control swelling while maintaining hip precautions. Make sure you get up and take a brief walk every two hours to help with circulation and prevent stiffness. Incentive spirometer 10X/hour. Ice to help with pain/inflammation (20 mins on, 20 mins off)    Anterior Hip Precautions: Maintain precautions recommended by physical therapy.   -DO NOT step backwards with surgical leg. No hip extension. No leaning backwards.   -DO NOT allow surgical leg to externally rotate (turn outwards).  Elevate the leg (while keeping hip precautions) as often as possible to help control swelling.   Keep TAM Dressing Clean, Dry and Intact. May shower with TAM Dressing. Please do not scrub, soak, peel or pick at the TAM dressing. No creams, lotions, powders, or oils over dressing. May shower and let water run over dressing, no baths. Pat dry once out of shower. Dressing to be removed in 7 days. If dressing is saturated from border to border - may remove and replace with clean dry dressing.    Shower instructions for TAM Dressing: Turn battery pack off. Twist OFF battery pack before entering shower. Once done with showering. Pat dressing dry. Reconnect and twist ON battery pack after you are dry. Then turn battery pack on.     There are no staples or stitches that need to be removed.       Elevate the leg as much as possible ("toes above the nose") to help control swelling while maintaining hip precautions. Make sure you get up and take a brief walk every two hours to help with circulation and prevent stiffness. Incentive spirometer 10X/hour. Ice to help with pain/inflammation (20 mins on, 20 mins off)    Anterior Hip Precautions: Maintain precautions recommended by physical therapy.   -DO NOT step backwards with surgical leg. No hip extension. No leaning backwards.   -DO NOT allow surgical leg to externally rotate (turn outwards).  Elevate the leg (while keeping hip precautions) as often as possible to help control swelling.     Keep TAM Dressing Clean, Dry and Intact. May shower with TAM Dressing. Please do not scrub, soak, peel or pick at the TAM dressing. No creams, lotions, powders, or oils over dressing. May shower and let water run over dressing, no baths. Pat dry once out of shower. Dressing to be removed in 7 days. If dressing is saturated from border to border - may remove and replace with clean dry dressing.    Shower instructions for TAM Dressing: Turn battery pack off. Twist OFF battery pack before entering shower. Once done with showering. Pat dressing dry. Reconnect and twist ON battery pack after you are dry. Then turn battery pack on.     There are no staples or stitches that need to be removed.

## 2025-07-07 NOTE — DISCHARGE NOTE PROVIDER - NSDCFUSCHEDAPPT_GEN_ALL_CORE_FT
[facility]  [address]  Scheduled Appointment: 08/01/2025

## 2025-07-07 NOTE — DISCHARGE NOTE PROVIDER - HOSPITAL COURSE
89yMale with history of OA presenting for R Ant LONNIE with Dr. [facility] on 7/7/25. Risk and benefits of surgery were explained to the patient. The patient understood and agreed to proceed with surgery. Patient underwent the procedure with no intraoperative complications. Pt was brought in stable condition to the PACU. Once stable in PACU, pt was brought to the floor. During hospital stay pt was followed by Medicine, physical therapy, Home Care during this admission. Pt is stable for discharge to 89yMale with history of OA presenting for R Ant LONNIE with Dr. Norris on 7/7/25. Risk and benefits of surgery were explained to the patient. The patient understood and agreed to proceed with surgery. Patient underwent the procedure with no intraoperative complications. Pt was brought in stable condition to the PACU. Once stable in PACU, pt was brought to the floor. During hospital stay pt was followed by Medicine, physical therapy, Home Care during this admission. Pt is stable for discharge to rehab   89yMale with history of OA presenting for R Ant LONNIE with Dr. Norris on 7/7/25. Risk and benefits of surgery were explained to the patient. The patient understood and agreed to proceed with surgery. Patient underwent the procedure with no intraoperative complications. Pt was brought in stable condition to the PACU. Once stable in PACU, pt was brought to the floor. During hospital stay pt was followed by Medicine, physical therapy, Home Care during this admission. Pt is stable for discharge to rehab.

## 2025-07-07 NOTE — CONSULT NOTE ADULT - SUBJECTIVE AND OBJECTIVE BOX
DONAVAN NOLAND is a 89y Male s/p ANTERIOR RIGHT TOTAL HIP ARTHROPLASTY WITH IMAGE      w/ h/o Glaucoma      denies any chest pain shortness of breath palpitation dizziness lightheadedness nausea vomiting fever or chills    S/P cataract surgery    H/O cystoscopy    H/O colonoscopy        SH: doesnot smoke or drink at this time    No Known Allergies    acetaminophen     Tablet .. 650 milliGRAM(s) Oral every 6 hours  acetaminophen   IVPB .. 1000 milliGRAM(s) IV Intermittent once  ascorbic acid 500 milliGRAM(s) Oral two times a day  ceFAZolin   IVPB 2000 milliGRAM(s) IV Intermittent every 8 hours  HYDROmorphone  Injectable 0.5 milliGRAM(s) IV Push every 3 hours PRN  lactated ringers. 1000 milliLiter(s) IV Continuous <Continuous>  multivitamin 1 Tablet(s) Oral daily  ondansetron Injectable 4 milliGRAM(s) IV Push every 6 hours PRN  oxyCODONE    IR 5 milliGRAM(s) Oral every 4 hours PRN  oxyCODONE    IR 10 milliGRAM(s) Oral every 4 hours PRN  pantoprazole    Tablet 40 milliGRAM(s) Oral before breakfast  polyethylene glycol 3350 17 Gram(s) Oral at bedtime  senna 2 Tablet(s) Oral at bedtime  traMADol 50 milliGRAM(s) Oral every 6 hours PRN    T(C): 36.7 (07-07-25 @ 18:36), Max: 36.7 (07-07-25 @ 18:36)  HR: 79 (07-07-25 @ 18:36) (63 - 88)  BP: 97/59 (07-07-25 @ 18:36) (93/57 - 123/72)  RR: 16 (07-07-25 @ 18:36) (11 - 18)  SpO2: 96% (07-07-25 @ 18:36) (94% - 100%)  HEENT unremarkable  neck no JVD or bruit  heart normal S1 S2 RRR no gallops or rubs  chest clear to auscultation  abd sof nontender non distended +bs  ext no calf tenderness    A/P   DVT PX  pain control  bowel regimen   wound care as per ortho  GI PX  antiemetics prn  incentive spirometer

## 2025-07-07 NOTE — PHYSICAL THERAPY INITIAL EVALUATION ADULT - ACTIVE RANGE OF MOTION EXAMINATION, REHAB EVAL
R hip c ant hip precautions/bilateral upper extremity Active ROM was WFL (within functional limits)/bilateral  lower extremity Active ROM was WFL (within functional limits)

## 2025-07-07 NOTE — OCCUPATIONAL THERAPY INITIAL EVALUATION ADULT - ADDITIONAL COMMENTS
Pt reported he lives alone in apartment. Pt was Independent with ADLs/IADLs and Independent with ambulation. Pt was also driving.

## 2025-07-07 NOTE — OCCUPATIONAL THERAPY INITIAL EVALUATION ADULT - GENERAL OBSERVATIONS, REHAB EVAL
Pt encountered semi supine in bed, NAD, all lines intact, pt reported no pain at rest s/p Right Anterior LONNIE, pt agreeable to OT eval, PT Anusha present, daughter at bedside.

## 2025-07-07 NOTE — DISCHARGE NOTE PROVIDER - NSDCMRMEDTOKEN_GEN_ALL_CORE_FT
folic acid: 1 tab(s) orally once a day  meloxicam 15 mg oral tablet: 1 tab(s) orally once a day as needed for  moderate pain   acetaminophen 325 mg oral tablet: 2 tab(s) orally every 6 hours  aspirin 81 mg oral delayed release tablet: 1 tab(s) orally 2 times a day  celecoxib 200 mg oral capsule: 1 cap(s) orally every 12 hours  folic acid: 1 tab(s) orally once a day  Multiple Vitamins oral tablet: 1 tab(s) orally once a day  oxyCODONE 10 mg oral tablet: 1 tab(s) orally every 4 hours As needed Severe Pain (7 - 10)  oxyCODONE 5 mg oral tablet: 1 tab(s) orally every 4 hours As needed Moderate Pain (4 - 6)  pantoprazole 40 mg oral delayed release tablet: 1 tab(s) orally once a day (before a meal)  senna leaf extract oral tablet: 2 tab(s) orally once a day (at bedtime)  traMADol 50 mg oral tablet: 1 tab(s) orally every 6 hours As needed Mild Pain (1 - 3)

## 2025-07-07 NOTE — ASU PATIENT PROFILE, ADULT - FALL HARM RISK - UNIVERSAL INTERVENTIONS
Bed in lowest position, wheels locked, appropriate side rails in place/Call bell, personal items and telephone in reach/Instruct patient to call for assistance before getting out of bed or chair/Non-slip footwear when patient is out of bed/[place] to call system/Physically safe environment - no spills, clutter or unnecessary equipment/Purposeful Proactive Rounding/Room/bathroom lighting operational, light cord in reach

## 2025-07-07 NOTE — PHYSICAL THERAPY INITIAL EVALUATION ADULT - ADDITIONAL COMMENTS
[facility]. Pt was Independent with ADLs/IADLs and Independent with ambulation. Pt was also driving.

## 2025-07-07 NOTE — OCCUPATIONAL THERAPY INITIAL EVALUATION ADULT - BALANCE TRAINING, PT EVAL
Pt will increase dynamic standing balance to fair plus in 2 weeks to increase participation in ADLs.

## 2025-07-07 NOTE — PATIENT PROFILE ADULT - FALL HARM RISK - HARM RISK INTERVENTIONS
Assistance with ambulation/Assistance OOB with selected safe patient handling equipment/Communicate Risk of Fall with Harm to all staff/Discuss with provider need for PT consult/Monitor gait and stability/Provide patient with walking aids - walker, cane, crutches/Reinforce activity limits and safety measures with patient and family/Sit up slowly, dangle for a short time, stand at bedside before walking/Tailored Fall Risk Interventions/Use of alarms - bed, chair and/or voice tab/Visual Cue: Yellow wristband and red socks/Bed in lowest position, wheels locked, appropriate side rails in place/Call bell, personal items and telephone in reach/Instruct patient to call for assistance before getting out of bed or chair/Non-slip footwear when patient is out of bed/[place] to call system/Physically safe environment - no spills, clutter or unnecessary equipment/Purposeful Proactive Rounding/Room/bathroom lighting operational, light cord in reach

## 2025-07-07 NOTE — PHYSICAL THERAPY INITIAL EVALUATION ADULT - GENERAL OBSERVATIONS, REHAB EVAL
Pt recd  in high wright's position nad, dtr at bedside. +TAM. No c/o pain at thi session. Vitals taken  by PCA staff during session 112/58   93   98% . Ant THR packet given, Precautions reviewed.

## 2025-07-07 NOTE — PHYSICAL THERAPY INITIAL EVALUATION ADULT - GAIT TRAINING, PT EVAL
Pt will be able to ambulate independently using assistive device up to 200 ft or more,  safely observing proper gait, posture and prevent falls.

## 2025-07-08 LAB
ANION GAP SERPL CALC-SCNC: 5 MMOL/L — SIGNIFICANT CHANGE UP (ref 5–17)
BUN SERPL-MCNC: 22 MG/DL — SIGNIFICANT CHANGE UP (ref 7–23)
CALCIUM SERPL-MCNC: 8.7 MG/DL — SIGNIFICANT CHANGE UP (ref 8.5–10.1)
CHLORIDE SERPL-SCNC: 110 MMOL/L — HIGH (ref 96–108)
CO2 SERPL-SCNC: 24 MMOL/L — SIGNIFICANT CHANGE UP (ref 22–31)
CREAT SERPL-MCNC: 0.76 MG/DL — SIGNIFICANT CHANGE UP (ref 0.5–1.3)
EGFR: 86 ML/MIN/1.73M2 — SIGNIFICANT CHANGE UP
EGFR: 86 ML/MIN/1.73M2 — SIGNIFICANT CHANGE UP
GLUCOSE SERPL-MCNC: 86 MG/DL — SIGNIFICANT CHANGE UP (ref 70–99)
HCT VFR BLD CALC: 31.9 % — LOW (ref 39–50)
HGB BLD-MCNC: 11 G/DL — LOW (ref 13–17)
MCHC RBC-ENTMCNC: 31.3 PG — SIGNIFICANT CHANGE UP (ref 27–34)
MCHC RBC-ENTMCNC: 34.5 G/DL — SIGNIFICANT CHANGE UP (ref 32–36)
MCV RBC AUTO: 90.6 FL — SIGNIFICANT CHANGE UP (ref 80–100)
NRBC BLD AUTO-RTO: 0 /100 WBCS — SIGNIFICANT CHANGE UP (ref 0–0)
PLATELET # BLD AUTO: 189 K/UL — SIGNIFICANT CHANGE UP (ref 150–400)
POTASSIUM SERPL-MCNC: 3.6 MMOL/L — SIGNIFICANT CHANGE UP (ref 3.5–5.3)
POTASSIUM SERPL-SCNC: 3.6 MMOL/L — SIGNIFICANT CHANGE UP (ref 3.5–5.3)
RBC # BLD: 3.52 M/UL — LOW (ref 4.2–5.8)
RBC # FLD: 13.6 % — SIGNIFICANT CHANGE UP (ref 10.3–14.5)
SODIUM SERPL-SCNC: 139 MMOL/L — SIGNIFICANT CHANGE UP (ref 135–145)
WBC # BLD: 13.35 K/UL — HIGH (ref 3.8–10.5)
WBC # FLD AUTO: 13.35 K/UL — HIGH (ref 3.8–10.5)

## 2025-07-08 RX ORDER — ACETAMINOPHEN 500 MG/5ML
2 LIQUID (ML) ORAL
Qty: 0 | Refills: 0 | DISCHARGE
Start: 2025-07-08

## 2025-07-08 RX ORDER — OXYCODONE HYDROCHLORIDE 30 MG/1
1 TABLET ORAL
Qty: 0 | Refills: 0 | DISCHARGE
Start: 2025-07-08

## 2025-07-08 RX ORDER — CELECOXIB 50 MG/1
1 CAPSULE ORAL
Qty: 0 | Refills: 0 | DISCHARGE
Start: 2025-07-08

## 2025-07-08 RX ORDER — TRAMADOL HYDROCHLORIDE 50 MG/1
1 TABLET, FILM COATED ORAL
Qty: 0 | Refills: 0 | DISCHARGE
Start: 2025-07-08

## 2025-07-08 RX ORDER — B1/B2/B3/B5/B6/B12/VIT C/FOLIC 500-0.5 MG
1 TABLET ORAL
Qty: 0 | Refills: 0 | DISCHARGE
Start: 2025-07-08

## 2025-07-08 RX ORDER — MELOXICAM 15 MG/1
1 TABLET ORAL
Refills: 0 | DISCHARGE

## 2025-07-08 RX ORDER — SENNA 187 MG
2 TABLET ORAL
Qty: 0 | Refills: 0 | DISCHARGE
Start: 2025-07-08

## 2025-07-08 RX ORDER — ASPIRIN 325 MG
1 TABLET ORAL
Qty: 0 | Refills: 0 | DISCHARGE
Start: 2025-07-08

## 2025-07-08 RX ADMIN — Medication 81 MILLIGRAM(S): at 17:14

## 2025-07-08 RX ADMIN — DEXAMETHASONE 102 MILLIGRAM(S): 0.5 TABLET ORAL at 06:03

## 2025-07-08 RX ADMIN — CELECOXIB 200 MILLIGRAM(S): 50 CAPSULE ORAL at 06:56

## 2025-07-08 RX ADMIN — Medication 500 MILLIGRAM(S): at 06:03

## 2025-07-08 RX ADMIN — POLYETHYLENE GLYCOL 3350 17 GRAM(S): 17 POWDER, FOR SOLUTION ORAL at 21:15

## 2025-07-08 RX ADMIN — CELECOXIB 200 MILLIGRAM(S): 50 CAPSULE ORAL at 06:04

## 2025-07-08 RX ADMIN — Medication 650 MILLIGRAM(S): at 18:14

## 2025-07-08 RX ADMIN — Medication 650 MILLIGRAM(S): at 00:40

## 2025-07-08 RX ADMIN — CELECOXIB 200 MILLIGRAM(S): 50 CAPSULE ORAL at 17:14

## 2025-07-08 RX ADMIN — Medication 81 MILLIGRAM(S): at 06:03

## 2025-07-08 RX ADMIN — Medication 1 TABLET(S): at 12:33

## 2025-07-08 RX ADMIN — Medication 650 MILLIGRAM(S): at 13:25

## 2025-07-08 RX ADMIN — Medication 650 MILLIGRAM(S): at 17:14

## 2025-07-08 RX ADMIN — Medication 650 MILLIGRAM(S): at 06:04

## 2025-07-08 RX ADMIN — Medication 650 MILLIGRAM(S): at 12:33

## 2025-07-08 RX ADMIN — Medication 650 MILLIGRAM(S): at 06:56

## 2025-07-08 RX ADMIN — Medication 500 MILLIGRAM(S): at 17:14

## 2025-07-08 RX ADMIN — CELECOXIB 200 MILLIGRAM(S): 50 CAPSULE ORAL at 18:14

## 2025-07-08 RX ADMIN — Medication 2 TABLET(S): at 21:15

## 2025-07-08 RX ADMIN — Medication 40 MILLIGRAM(S): at 06:04

## 2025-07-09 ENCOUNTER — TRANSCRIPTION ENCOUNTER (OUTPATIENT)
Age: 89
End: 2025-07-09

## 2025-07-09 VITALS
HEART RATE: 66 BPM | RESPIRATION RATE: 18 BRPM | SYSTOLIC BLOOD PRESSURE: 103 MMHG | OXYGEN SATURATION: 99 % | DIASTOLIC BLOOD PRESSURE: 60 MMHG | TEMPERATURE: 98 F

## 2025-07-09 RX ADMIN — Medication 81 MILLIGRAM(S): at 05:30

## 2025-07-09 RX ADMIN — Medication 40 MILLIGRAM(S): at 05:30

## 2025-07-09 RX ADMIN — CELECOXIB 200 MILLIGRAM(S): 50 CAPSULE ORAL at 06:06

## 2025-07-09 RX ADMIN — Medication 1 TABLET(S): at 11:57

## 2025-07-09 RX ADMIN — Medication 500 MILLIGRAM(S): at 05:29

## 2025-07-09 RX ADMIN — CELECOXIB 200 MILLIGRAM(S): 50 CAPSULE ORAL at 05:30

## 2025-07-09 RX ADMIN — Medication 650 MILLIGRAM(S): at 11:56

## 2025-07-09 RX ADMIN — Medication 650 MILLIGRAM(S): at 06:05

## 2025-07-09 RX ADMIN — Medication 650 MILLIGRAM(S): at 05:30

## 2025-07-09 RX ADMIN — Medication 650 MILLIGRAM(S): at 12:56

## 2025-07-09 NOTE — PROGRESS NOTE ADULT - SUBJECTIVE AND OBJECTIVE BOX
DONAVAN NOLAND is a 89y Male s/p ANTERIOR RIGHT TOTAL HIP ARTHROPLASTY WITH IMAGE        denies any chest pain shortness of breath palpitation dizziness lightheadedness nausea vomiting fever or chills    T(C): 36.8 (07-09-25 @ 09:00), Max: 37 (07-08-25 @ 17:15)  HR: 75 (07-09-25 @ 10:22) (68 - 80)  BP: 110/55 (07-09-25 @ 10:22) (93/61 - 131/79)  RR: 18 (07-09-25 @ 10:22) (17 - 18)  SpO2: 98% (07-09-25 @ 10:22) (97% - 98%)  no jvd/bruit  s1 s2 rrr  cta  s/nt/nd  no calf tend                        11.0   13.35 )-----------( 189      ( 08 Jul 2025 06:20 )             31.9   07-08    139  |  110[H]  |  22  ----------------------------<  86  3.6   |  24  |  0.76    Ca    8.7      08 Jul 2025 06:20        cont dvt px  pain control  bowel regimen  antiemetics  incentive spirometer
89yMale s/p R LONNIE Anterior POD#2.  Pt seen and examined in NAD. Pain controlled. Pt denies any new complaints. Pt denies CP/SOB/N/V/D/numbness/tingling/bowel or bladder dysfunction. (+) voids (+)tolerating PO Diet    PE:   RLE: dressing c/d/i. +ROM ankle/toes. Calf: soft, compressible and nontender. DP/PT 2+ NVI.                           11.0   13.35 )-----------( 189      ( 08 Jul 2025 06:20 )             31.9       07-08    139  |  110[H]  |  22  ----------------------------<  86  3.6   |  24  |  0.76    Ca    8.7      08 Jul 2025 06:20          A/P: 89yMale s/p R LONNIE POD#2    Pain control prn  (+) Total hip precautions  PT: WBAT   DVT ppx: SCDs and ASA BID  Wound care, Isometric exercises, incentive spirometry   Discharge: planning Rehab  All the above discussed and understood by pt   
DONAVAN NOLAND is a 89y Male s/p ANTERIOR RIGHT TOTAL HIP ARTHROPLASTY WITH IMAGE        denies any chest pain shortness of breath palpitation dizziness lightheadedness nausea vomiting fever or chills    T(C): 36.4 (07-08-25 @ 13:00), Max: 36.7 (07-07-25 @ 18:36)  HR: 68 (07-08-25 @ 14:47) (67 - 93)  BP: 107/52 (07-08-25 @ 14:47) (95/60 - 121/63)  RR: 18 (07-08-25 @ 14:47) (16 - 18)  SpO2: 98% (07-08-25 @ 14:47) (96% - 99%)  no jvd/bruit  s1 s2 rrr  cta  s/nt/nd  no calf tend                         11.0   13.35 )-----------( 189      ( 08 Jul 2025 06:20 )             31.9                        11.0   13.35 )-----------( 189      ( 08 Jul 2025 06:20 )             31.9       cont dvt px  pain control  bowel regimen  antiemetics  incentive spirometer
Patient is s/p R Anterior LONNIE POD#0  Pt tolerated procedure well without any intra-op complications.   Pt doing well at this time. Pain is controlled.   Denies CP/SOB/Dizziness/N/V/D/HA.     Vital Signs Last 24 Hrs  T(C): 36.1 (07 Jul 2025 12:45), Max: 36.6 (07 Jul 2025 11:45)  T(F): 97 (07 Jul 2025 12:45), Max: 97.8 (07 Jul 2025 11:45)  HR: 78 (07 Jul 2025 12:45) (63 - 79)  BP: 102/60 (07 Jul 2025 12:45) (93/57 - 123/72)  BP(mean): 92 (07 Jul 2025 06:51) (92 - 92)  RR: 14 (07 Jul 2025 12:45) (11 - 18)  SpO2: 94% (07 Jul 2025 12:45) (94% - 100%)    Parameters below as of 07 Jul 2025 12:45  Patient On (Oxygen Delivery Method): room air        PE:  General: A&Ox3 NAD  RLE: Dressing C/D/I. Motor intact + EHL/FHL/TA/GS. Sensation is grossly intact. Extremity warm. Compartments soft, compressible, no calf tenderness. DP 2+   LLE: Motor intact + EHL/FHL/TA/GS. Sensation is grossly intact. Extremity warm. Compartments soft, compressible, no calf tenderness. DP 2+     Labs:                          10.9   10.20 )-----------( 199      ( 07 Jul 2025 10:33 )             33.3       07-07    137  |  112[H]  |  17  ----------------------------<  104[H]  3.6   |  16[L]  |  0.65    Ca    8.6      07 Jul 2025 10:33        A/P: Patient is a 89y y/o Male s/p R Anterior LONNIE, POD #0  -wound care, isometric exercises, GI motility, new medications, hospital course reviewed with pt  -Pain control/analgesia  -Inc spirometry reviewed and counseled  -DVT ppx with venodynes and ASA 81 BID starting POD#1  -F/U AM Labs  -PT/OT/WBAT, Anterior hip precautions,   -Antibiotic post op  -Medical consult  -Discharge planning  Pt requires rolling walker to perform MRADLs at home.     
Patient is s/p R Anterior LONNIE POD#1  Pt tolerated procedure well without any intra-op complications.   Pt doing well at this time. Pain is controlled.   Denies CP/SOB/Dizziness/N/V/D/HA.     Vital Signs Last 24 Hrs  T(C): 36.4 (08 Jul 2025 09:49), Max: 36.7 (07 Jul 2025 18:36)  T(F): 97.5 (08 Jul 2025 09:49), Max: 98.1 (08 Jul 2025 06:00)  HR: 72 (08 Jul 2025 10:41) (67 - 93)  BP: 101/56 (08 Jul 2025 10:41) (95/63 - 123/72)  BP(mean): --  RR: 18 (08 Jul 2025 10:41) (14 - 18)  SpO2: 97% (08 Jul 2025 10:41) (94% - 99%)    Parameters below as of 08 Jul 2025 10:41  Patient On (Oxygen Delivery Method): room air        PE:  General: A&Ox3 NAD  RLE: Dressing C/D/I. Motor intact + EHL/FHL/TA/GS. Sensation is grossly intact. Extremity warm. Compartments soft, compressible, no calf tenderness. DP 2+   LLE: Motor intact + EHL/FHL/TA/GS. Sensation is grossly intact. Extremity warm. Compartments soft, compressible, no calf tenderness. DP 2+     Labs:                          11.0   13.35 )-----------( 189      ( 08 Jul 2025 06:20 )             31.9       07-08    139  |  110[H]  |  22  ----------------------------<  86  3.6   |  24  |  0.76    Ca    8.7      08 Jul 2025 06:20        A/P: Patient is a 89y y/o Male s/p R Anterior LONNIE, POD #1  -wound care, isometric exercises, GI motility, new medications, hospital course reviewed with pt  -Pain control/analgesia  -Inc spirometry reviewed and counseled  -DVT ppx with venodynes and ASA 81 BID starting POD#1  -F/U AM Labs  -PT/OT/WBAT, Anterior hip precautions,   -Antibiotic post op  -Medical consult  -Discharge planning  Pt requires rolling walker to perform MRADLs at home.

## 2025-07-09 NOTE — DISCHARGE NOTE NURSING/CASE MANAGEMENT/SOCIAL WORK - NSDCFUADDAPPT_GEN_ALL_CORE_FT

## 2025-07-09 NOTE — DISCHARGE NOTE NURSING/CASE MANAGEMENT/SOCIAL WORK - FINANCIAL ASSISTANCE
[facility] provides services at a reduced cost to those who are determined to be eligible through [facility]’s financial assistance program. Information regarding [facility]’s financial assistance program can be found by going to [email or web address] or by calling [phone].

## 2025-07-09 NOTE — DISCHARGE NOTE NURSING/CASE MANAGEMENT/SOCIAL WORK - PATIENT PORTAL LINK FT
You can access the FollowMyHealth Patient Portal offered by [facility] by registering at the following website: [email or web address]. By joining [facility]’s FollowMyHealth portal, you will also be able to view your health information using other applications (apps) compatible with our system.

## 2025-07-14 ENCOUNTER — NON-APPOINTMENT (OUTPATIENT)
Age: 89
End: 2025-07-14

## 2025-07-14 DIAGNOSIS — Z91.81 HISTORY OF FALLING: ICD-10-CM

## 2025-07-14 DIAGNOSIS — M16.11 UNILATERAL PRIMARY OSTEOARTHRITIS, RIGHT HIP: ICD-10-CM

## 2025-07-14 DIAGNOSIS — I45.10 UNSPECIFIED RIGHT BUNDLE-BRANCH BLOCK: ICD-10-CM

## 2025-07-14 DIAGNOSIS — M25.751 OSTEOPHYTE, RIGHT HIP: ICD-10-CM

## 2025-08-01 ENCOUNTER — APPOINTMENT (OUTPATIENT)
Dept: ORTHOPEDIC SURGERY | Facility: CLINIC | Age: 89
End: 2025-08-01
Payer: MEDICARE

## 2025-08-01 VITALS — WEIGHT: 141 LBS | HEIGHT: 66 IN | BODY MASS INDEX: 22.66 KG/M2

## 2025-08-01 DIAGNOSIS — Z96.641 PRESENCE OF RIGHT ARTIFICIAL HIP JOINT: ICD-10-CM

## 2025-08-01 PROCEDURE — 73503 X-RAY EXAM HIP UNI 4/> VIEWS: CPT | Mod: RT

## 2025-08-01 PROCEDURE — 99024 POSTOP FOLLOW-UP VISIT: CPT

## 2025-08-11 ENCOUNTER — NON-APPOINTMENT (OUTPATIENT)
Age: 89
End: 2025-08-11

## 2025-09-03 ENCOUNTER — APPOINTMENT (OUTPATIENT)
Dept: ORTHOPEDIC SURGERY | Facility: CLINIC | Age: 89
End: 2025-09-03
Payer: MEDICARE

## 2025-09-03 VITALS — WEIGHT: 141 LBS | HEIGHT: 66 IN | BODY MASS INDEX: 22.66 KG/M2

## 2025-09-03 DIAGNOSIS — Z96.641 PRESENCE OF RIGHT ARTIFICIAL HIP JOINT: ICD-10-CM

## 2025-09-03 PROCEDURE — 99024 POSTOP FOLLOW-UP VISIT: CPT

## 2025-09-03 RX ORDER — CEPHALEXIN 500 MG/1
500 CAPSULE ORAL 4 TIMES DAILY
Qty: 56 | Refills: 0 | Status: ACTIVE | COMMUNITY
Start: 2025-09-03 | End: 1900-01-01

## 2025-09-17 ENCOUNTER — APPOINTMENT (OUTPATIENT)
Dept: ORTHOPEDIC SURGERY | Facility: CLINIC | Age: 89
End: 2025-09-17
Payer: MEDICARE

## 2025-09-17 VITALS — HEIGHT: 65 IN | BODY MASS INDEX: 23.32 KG/M2 | WEIGHT: 140 LBS

## 2025-09-17 DIAGNOSIS — Z96.641 PRESENCE OF RIGHT ARTIFICIAL HIP JOINT: ICD-10-CM

## 2025-09-17 PROCEDURE — 99024 POSTOP FOLLOW-UP VISIT: CPT

## 2025-09-19 ENCOUNTER — APPOINTMENT (OUTPATIENT)
Dept: ORTHOPEDIC SURGERY | Facility: CLINIC | Age: 89
End: 2025-09-19

## (undated) DEVICE — Device

## (undated) DEVICE — GLV 8 PROTEXIS (BLUE)

## (undated) DEVICE — DRAPE C ARM 41X140"

## (undated) DEVICE — DRAPE TOWEL BLUE 17" X 24"

## (undated) DEVICE — DRSG PICO NPWT 4X12"

## (undated) DEVICE — WOUND IRR IRRISEPT W 0.5 CHG

## (undated) DEVICE — FRA-ESU BOVIE FORCE TRIAD T7J19717DX: Type: DURABLE MEDICAL EQUIPMENT

## (undated) DEVICE — GOWN XL

## (undated) DEVICE — DRAPE SHOWER CURTAIN ISOLATION

## (undated) DEVICE — VENODYNE/SCD SLEEVE CALF MEDIUM

## (undated) DEVICE — ELCTR BOVIE TIP BLADE INSULATED 6.5" EDGE

## (undated) DEVICE — SUT STRATAFIX SYMMETRIC PDS PLUS 1 18" CTX VIOLET

## (undated) DEVICE — ELCTR VALLEYLAB PENCIL SMOKE W/ EDGE ELECTRODE 10FT

## (undated) DEVICE — WARMING BLANKET UPPER ADULT

## (undated) DEVICE — SUT STRATAFIX SPIRAL MONOCRYL PLUS 4-0 45CM PS-2 UNDYED

## (undated) DEVICE — SAW BLADE STRYKER SAGITTAL DUAL CUT 18X90X1.19MM

## (undated) DEVICE — SUT VICRYL 0 36" CT-1 UNDYED

## (undated) DEVICE — PACK TOTAL HIP

## (undated) DEVICE — DRAPE IOBAN 33" X 23"

## (undated) DEVICE — SYR LUER LOK 20CC

## (undated) DEVICE — ELCTR AQUAMANTYS BIPOLAR SEALER 6.0

## (undated) DEVICE — ELCTR GROUNDING PAD ADULT COVIDIEN

## (undated) DEVICE — TAPE SILK 1"

## (undated) DEVICE — DRSG DERMABOND PRINEO 22CM

## (undated) DEVICE — DRSG WEBRIL 6"

## (undated) DEVICE — HOOD T5 PEELAWAY

## (undated) DEVICE — DRAPE 3/4 SHEET W REINFORCEMENT 56X77"

## (undated) DEVICE — NDL HYPO SAFE 18G X 1.5" (PINK)

## (undated) DEVICE — SUT STRATAFIX SPIRAL PDS PLUS 2-0 45CM CT-1

## (undated) DEVICE — FRAZIER SUCTION TIP 18FR

## (undated) DEVICE — SUT ETHIBOND 5 4-30" V-37

## (undated) DEVICE — GLV 7.5 PROTEXIS ORTHO (BROWN)

## (undated) DEVICE — POSITIONER HANA PATIENT CARE KIT POSITION SUPINE

## (undated) DEVICE — DRAPE STICKY U BLUE 60 X 84"